# Patient Record
Sex: MALE | Race: ASIAN | NOT HISPANIC OR LATINO | URBAN - METROPOLITAN AREA
[De-identification: names, ages, dates, MRNs, and addresses within clinical notes are randomized per-mention and may not be internally consistent; named-entity substitution may affect disease eponyms.]

---

## 2024-04-27 ENCOUNTER — EMERGENCY (EMERGENCY)
Facility: HOSPITAL | Age: 34
LOS: 1 days | Discharge: HOME | End: 2024-04-27
Attending: EMERGENCY MEDICINE | Admitting: EMERGENCY MEDICINE
Payer: MEDICAID

## 2024-04-27 VITALS
OXYGEN SATURATION: 100 % | DIASTOLIC BLOOD PRESSURE: 65 MMHG | HEART RATE: 77 BPM | SYSTOLIC BLOOD PRESSURE: 104 MMHG | RESPIRATION RATE: 20 BRPM | TEMPERATURE: 98 F

## 2024-04-27 VITALS
TEMPERATURE: 100 F | RESPIRATION RATE: 18 BRPM | DIASTOLIC BLOOD PRESSURE: 71 MMHG | OXYGEN SATURATION: 100 % | SYSTOLIC BLOOD PRESSURE: 111 MMHG | HEART RATE: 110 BPM

## 2024-04-27 LAB
ALBUMIN SERPL ELPH-MCNC: 2.8 G/DL — LOW (ref 3.3–5)
ALP SERPL-CCNC: 135 U/L — HIGH (ref 40–120)
ALT FLD-CCNC: 91 U/L — HIGH (ref 4–41)
ANION GAP SERPL CALC-SCNC: 13 MMOL/L — SIGNIFICANT CHANGE UP (ref 7–14)
ANISOCYTOSIS BLD QL: SLIGHT — SIGNIFICANT CHANGE UP
AST SERPL-CCNC: 61 U/L — HIGH (ref 4–40)
BASE EXCESS BLDV CALC-SCNC: -0.2 MMOL/L — SIGNIFICANT CHANGE UP (ref -2–3)
BASOPHILS # BLD AUTO: 0 K/UL — SIGNIFICANT CHANGE UP (ref 0–0.2)
BASOPHILS NFR BLD AUTO: 0 % — SIGNIFICANT CHANGE UP (ref 0–2)
BILIRUB SERPL-MCNC: 0.5 MG/DL — SIGNIFICANT CHANGE UP (ref 0.2–1.2)
BLOOD GAS VENOUS COMPREHENSIVE RESULT: SIGNIFICANT CHANGE UP
BUN SERPL-MCNC: 5 MG/DL — LOW (ref 7–23)
CALCIUM SERPL-MCNC: 8.5 MG/DL — SIGNIFICANT CHANGE UP (ref 8.4–10.5)
CHLORIDE BLDV-SCNC: 102 MMOL/L — SIGNIFICANT CHANGE UP (ref 96–108)
CHLORIDE SERPL-SCNC: 101 MMOL/L — SIGNIFICANT CHANGE UP (ref 98–107)
CO2 BLDV-SCNC: 23.8 MMOL/L — SIGNIFICANT CHANGE UP (ref 22–26)
CO2 SERPL-SCNC: 19 MMOL/L — LOW (ref 22–31)
CREAT SERPL-MCNC: 0.71 MG/DL — SIGNIFICANT CHANGE UP (ref 0.5–1.3)
CULTURE RESULTS: SIGNIFICANT CHANGE UP
EGFR: 123 ML/MIN/1.73M2 — SIGNIFICANT CHANGE UP
EOSINOPHIL # BLD AUTO: 0 K/UL — SIGNIFICANT CHANGE UP (ref 0–0.5)
EOSINOPHIL NFR BLD AUTO: 0 % — SIGNIFICANT CHANGE UP (ref 0–6)
FLUAV AG NPH QL: SIGNIFICANT CHANGE UP
FLUBV AG NPH QL: SIGNIFICANT CHANGE UP
GAS PNL BLDV: 129 MMOL/L — LOW (ref 136–145)
GAS PNL BLDV: SIGNIFICANT CHANGE UP
GLUCOSE BLDV-MCNC: 116 MG/DL — HIGH (ref 70–99)
GLUCOSE SERPL-MCNC: 99 MG/DL — SIGNIFICANT CHANGE UP (ref 70–99)
HCO3 BLDV-SCNC: 23 MMOL/L — SIGNIFICANT CHANGE UP (ref 22–29)
HCT VFR BLD CALC: 40.2 % — SIGNIFICANT CHANGE UP (ref 39–50)
HCT VFR BLDA CALC: 43 % — SIGNIFICANT CHANGE UP (ref 39–51)
HGB BLD CALC-MCNC: 14.3 G/DL — SIGNIFICANT CHANGE UP (ref 12.6–17.4)
HGB BLD-MCNC: 13.9 G/DL — SIGNIFICANT CHANGE UP (ref 13–17)
HYPOCHROMIA BLD QL: SLIGHT — SIGNIFICANT CHANGE UP
IANC: 20.29 K/UL — HIGH (ref 1.8–7.4)
LACTATE BLDV-MCNC: 1.8 MMOL/L — SIGNIFICANT CHANGE UP (ref 0.5–2)
LYMPHOCYTES # BLD AUTO: 2.48 K/UL — SIGNIFICANT CHANGE UP (ref 1–3.3)
LYMPHOCYTES # BLD AUTO: 9.5 % — LOW (ref 13–44)
MCHC RBC-ENTMCNC: 25.8 PG — LOW (ref 27–34)
MCHC RBC-ENTMCNC: 34.6 GM/DL — SIGNIFICANT CHANGE UP (ref 32–36)
MCV RBC AUTO: 74.6 FL — LOW (ref 80–100)
MICROCYTES BLD QL: SLIGHT — SIGNIFICANT CHANGE UP
MONOCYTES # BLD AUTO: 2.25 K/UL — HIGH (ref 0–0.9)
MONOCYTES NFR BLD AUTO: 8.6 % — SIGNIFICANT CHANGE UP (ref 2–14)
NEUTROPHILS # BLD AUTO: 20.49 K/UL — HIGH (ref 1.8–7.4)
NEUTROPHILS NFR BLD AUTO: 78.4 % — HIGH (ref 43–77)
PCO2 BLDV: 32 MMHG — LOW (ref 42–55)
PH BLDV: 7.46 — HIGH (ref 7.32–7.43)
PLAT MORPH BLD: ABNORMAL
PLATELET # BLD AUTO: 479 K/UL — HIGH (ref 150–400)
PLATELET COUNT - ESTIMATE: NORMAL — SIGNIFICANT CHANGE UP
PO2 BLDV: 38 MMHG — SIGNIFICANT CHANGE UP (ref 25–45)
POLYCHROMASIA BLD QL SMEAR: SLIGHT — SIGNIFICANT CHANGE UP
POTASSIUM BLDV-SCNC: 3.4 MMOL/L — LOW (ref 3.5–5.1)
POTASSIUM SERPL-MCNC: 3.8 MMOL/L — SIGNIFICANT CHANGE UP (ref 3.5–5.3)
POTASSIUM SERPL-SCNC: 3.8 MMOL/L — SIGNIFICANT CHANGE UP (ref 3.5–5.3)
PROT SERPL-MCNC: 7.3 G/DL — SIGNIFICANT CHANGE UP (ref 6–8.3)
RBC # BLD: 5.39 M/UL — SIGNIFICANT CHANGE UP (ref 4.2–5.8)
RBC # FLD: 13.8 % — SIGNIFICANT CHANGE UP (ref 10.3–14.5)
RBC BLD AUTO: ABNORMAL
RSV RNA NPH QL NAA+NON-PROBE: SIGNIFICANT CHANGE UP
SAO2 % BLDV: 58.3 % — LOW (ref 67–88)
SARS-COV-2 RNA SPEC QL NAA+PROBE: SIGNIFICANT CHANGE UP
SODIUM SERPL-SCNC: 133 MMOL/L — LOW (ref 135–145)
SPECIMEN SOURCE: SIGNIFICANT CHANGE UP
VARIANT LYMPHS # BLD: 3.5 % — SIGNIFICANT CHANGE UP (ref 0–6)
WBC # BLD: 26.14 K/UL — HIGH (ref 3.8–10.5)
WBC # FLD AUTO: 26.14 K/UL — HIGH (ref 3.8–10.5)

## 2024-04-27 PROCEDURE — 71046 X-RAY EXAM CHEST 2 VIEWS: CPT | Mod: 26

## 2024-04-27 PROCEDURE — 99285 EMERGENCY DEPT VISIT HI MDM: CPT

## 2024-04-27 PROCEDURE — 93010 ELECTROCARDIOGRAM REPORT: CPT

## 2024-04-27 RX ORDER — SODIUM CHLORIDE 9 MG/ML
1000 INJECTION INTRAMUSCULAR; INTRAVENOUS; SUBCUTANEOUS ONCE
Refills: 0 | Status: COMPLETED | OUTPATIENT
Start: 2024-04-27 | End: 2024-04-27

## 2024-04-27 RX ORDER — CEFTRIAXONE 500 MG/1
1000 INJECTION, POWDER, FOR SOLUTION INTRAMUSCULAR; INTRAVENOUS ONCE
Refills: 0 | Status: COMPLETED | OUTPATIENT
Start: 2024-04-27 | End: 2024-04-27

## 2024-04-27 RX ORDER — AZITHROMYCIN 500 MG/1
1 TABLET, FILM COATED ORAL
Qty: 5 | Refills: 0
Start: 2024-04-27 | End: 2024-05-01

## 2024-04-27 RX ORDER — AZITHROMYCIN 500 MG/1
500 TABLET, FILM COATED ORAL ONCE
Refills: 0 | Status: COMPLETED | OUTPATIENT
Start: 2024-04-27 | End: 2024-04-27

## 2024-04-27 RX ORDER — ACETAMINOPHEN 500 MG
1000 TABLET ORAL ONCE
Refills: 0 | Status: COMPLETED | OUTPATIENT
Start: 2024-04-27 | End: 2024-04-27

## 2024-04-27 RX ADMIN — CEFTRIAXONE 100 MILLIGRAM(S): 500 INJECTION, POWDER, FOR SOLUTION INTRAMUSCULAR; INTRAVENOUS at 07:55

## 2024-04-27 RX ADMIN — SODIUM CHLORIDE 1000 MILLILITER(S): 9 INJECTION INTRAMUSCULAR; INTRAVENOUS; SUBCUTANEOUS at 08:43

## 2024-04-27 RX ADMIN — SODIUM CHLORIDE 1000 MILLILITER(S): 9 INJECTION INTRAMUSCULAR; INTRAVENOUS; SUBCUTANEOUS at 07:35

## 2024-04-27 RX ADMIN — AZITHROMYCIN 255 MILLIGRAM(S): 500 TABLET, FILM COATED ORAL at 08:43

## 2024-04-27 RX ADMIN — Medication 400 MILLIGRAM(S): at 07:34

## 2024-04-27 NOTE — ED ADULT NURSE REASSESSMENT NOTE - NS ED NURSE REASSESS COMMENT FT1
Patient remains at baseline mental status. Appears to be resting comfortably in stretcher, breathing even and unlabored on room air. Vital signs as charted. NAD noted at this time. NSR on monitor. Pt offers no new complaints at this time. Medications administered as ordered as per eMAR. IV site clean dry and intact. Safety maintained, stretcher locked in lowest position with siderails up x2, call bell and personal items within reach.

## 2024-04-27 NOTE — ED PROVIDER NOTE - OBJECTIVE STATEMENT
33-year-old male with no significant past medical history presented to emergency department for flulike symptoms consisting of fever/chills, cough, congestion with some mild shortness of breath since Sunday, 4/21/2024.  Patient recently traveled back from an 18-day trip in [facility].  For started with chills on Sunday.  When he landed on Tuesday 4/23 patient reports his symptoms quickly progressed.  Saw his PCP who started him on amoxicillin but reports concerns for malaria.  Patient denies any malaria prophylaxis prior to his trip.  Denies any known sick contacts.  No abdominal pain, nausea/vomiting/diarrhea.  Patient denies chest pain.  Denies headaches, unilateral weakness or changes in mental status.  Denies rashes.  Denies urinary symptoms.  No cyclical nature fevers, remain persistent. 33-year-old male with no significant past medical history presented to emergency department for flulike symptoms consisting of fever/chills, cough, congestion with some mild shortness of breath since Sunday, 4/21/2024.  Patient recently traveled back from an 18-day trip in [facility].  For started with chills on Sunday. When he landed on Tuesday 4/23 patient reports his symptoms quickly progressed.  Saw his PCP who started him on amoxicillin, of which he only started yesterday, but reported concerns for malaria.  Patient denies any malaria prophylaxis prior to his trip.  Denies any known sick contacts.  No abdominal pain, nausea/vomiting/diarrhea.  Patient denies chest pain.  Denies headaches, unilateral weakness or changes in mental status.  Denies rashes.  Denies urinary symptoms.  No cyclical nature fevers, remain persistent.

## 2024-04-27 NOTE — ED PROVIDER NOTE - NSFOLLOWUPINSTRUCTIONS_ED_ALL_ED_FT
You were seen in the ER for fevers and mild shortness of breath with cough. Your CXR showed signs of multifocal pneumonia. You were treated with a course of IV antibiotics. Your blood work showed signs of an acute infection. Given your travel history we sent off malaria testing; we have low suspicion for malaria or other atypical parasitic or viral diseases, but if the tests are positive you will be notified.    Continue taking the augmentin antibiotic that was given to you by your primary care doctor. Continue taking until finished, even if you feel completely better. Inform your primary doctor if you experience rash, shortness of breath, abdominal pain, or diarrhea.     Another antibiotic called azithromycin was sent to your pharmacy. Please  your antibiotics from the pharmacy and take them as prescribed. Continue taking until finished, even if you feel completely better. Inform your primary doctor if you experience rash, shortness of breath, abdominal pain, or diarrhea.     You may take 500-1000 mg acetaminophen every 6 hours, as needed for pain.  You may take 600 mg ibuprofen every 8 hours, with food, as needed for pain.    Please ensure adequate oral hydration to prevent dehydration.    Follow up with your primary physician in 1-2 days. If needed call [phone] to find a primary care physician or call  [phone] to schedule an appointment with the general medicine.    1. TAKE ALL MEDICATIONS AS DIRECTED.    2. FOR PAIN OR FEVER YOU CAN TAKE IBUPROFEN (MOTRIN, ADVIL) OR ACETAMINOPHEN (TYLENOL) AS NEEDED, AS DIRECTED ON PACKAGING.  3. FOLLOW UP WITH YOUR PRIMARY DOCTOR WITHIN 5 DAYS AS DIRECTED.  4. IF YOU HAD LABS OR IMAGING DONE, YOU WERE GIVEN COPIES OF ALL LABS AND/OR IMAGING RESULTS FROM YOUR ER VISIT--PLEASE TAKE THEM WITH YOU TO YOUR FOLLOW UP APPOINTMENTS.  5. RETURN TO THE ER FOR ANY WORSENING SYMPTOMS OR CONCERNS..  ----------------------------------------------------------------------------------------------------------------   Community-Acquired Pneumonia, Adult  Pneumonia is a lung infection that causes inflammation and the buildup of mucus and fluids in the lungs. This may cause coughing and difficulty breathing. Community-acquired pneumonia is pneumonia that develops in people who are not, and have not recently been, in a hospital or other health care facility.    Usually, pneumonia develops as a result of an illness that is caused by a virus, such as the common cold and the flu (influenza). It can also be caused by bacteria or fungi. While the common cold and influenza can pass from person to person (are contagious), pneumonia itself is not considered contagious.    What are the causes?  The human body, showing how a virus travels from the air to a person's lungs.   This condition may be caused by:  Viruses.  Bacteria.  Fungi.    What increases the risk?  The following factors may make you more likely to develop this condition:  Being over age 65 or having certain medical conditions, such as:  A long-term (chronic) disease, such as: chronic obstructive pulmonary disease (COPD), asthma, heart failure, diabetes, or kidney disease.  A condition that increases the risk of breathing in (aspirating) mucus and other fluids from your mouth and nose.  A weakened body defense system (immune system).  Having had your spleen removed (splenectomy). The spleen is the organ that helps fight germs and infections.  Not cleaning your teeth and gums well (poor dental hygiene).  Using tobacco products.  Traveling to places where germs that cause pneumonia are present or being near certain animals or animal habitats that could have germs that cause pneumonia.    What are the signs or symptoms?  Symptoms of this condition include:  A dry cough or a wet (productive) cough.  A fever, sweating, or chills.  Chest pain, especially when breathing deeply or coughing.  Fast breathing, difficulty breathing, or shortness of breath.  Tiredness (fatigue) and muscle aches.  How is this diagnosed?  An outline of a person's body and an image of an X-ray of the person's chest.   This condition may be diagnosed based on your medical history or a physical exam. You may also have tests, including:  Imaging, such as a chest X-ray or lung ultrasound.  Tests of:  The level of oxygen and other gases in your blood.  Mucus from your lungs (sputum).  Fluid around your lungs (pleural fluid).  Your urine.    How is this treated?  Treatment for this condition depends on many factors, such as the cause of your pneumonia, your medicines, and other medical conditions that you have.    For most adults, pneumonia may be treated at home. In some cases, treatment must happen in a hospital and may include:  Medicines that are given by mouth (orally) or through an IV, including:  Antibiotic medicines, if bacteria caused the pneumonia.  Medicines that kill viruses (antiviral medicines), if a virus caused the pneumonia.  Oxygen therapy.  Severe pneumonia, although rare, may require the following treatments:  Mechanical ventilation. This procedure uses a machine to help you breathe if you cannot breathe well on your own or maintain a safe level of blood oxygen.  Thoracentesis. This procedure removes any buildup of pleural fluid to help with breathing.    Follow these instructions at home:    Medicines    Take over-the-counter and prescription medicines only as told by your health care provider.  Take cough medicine only if you have trouble sleeping. Cough medicine can prevent your body from removing mucus from your lungs.  If you were prescribed antibiotics, take them as told by your health care provider. Do not stop taking the antibiotic even if you start to feel better.      Lifestyle    A sign showing that a person should not drink alcohol.  A sign showing that a person should not smoke.  Do not drink alcohol.  Do not use any products that contain nicotine or tobacco. These products include cigarettes, chewing tobacco, and vaping devices, such as e-cigarettes. If you need help quitting, ask your health care provider.  Eat a healthy diet. This includes plenty of vegetables, fruits, whole grains, low-fat dairy products, and lean protein.    General instructions    Rest a lot and get at least 8 hours of sleep each night.  Sleep in a partly upright position at night. Place a few pillows under your head or sleep in a reclining chair.  Return to your normal activities as told by your health care provider. Ask your health care provider what activities are safe for you.  Drink enough fluid to keep your urine pale yellow. This helps to thin the mucus in your lungs.  If your throat is sore, gargle with a mixture of salt and water 3–4 times a day or as needed. To make salt water, completely dissolve ½–1 tsp (3–6 g) of salt in 1 cup (237 mL) of warm water.  Keep all follow-up visits.    How is this prevented?  You can lower your risk of developing community-acquired pneumonia by:  Getting the pneumonia vaccine. There are different types and schedules of pneumonia vaccines. Ask your health care provider which option is best for you. Consider getting the pneumonia vaccine if:  You are older than 65 years of age.  You are 19–65 years of age and are receiving cancer treatment, have chronic lung disease, or have other medical conditions that affect your immune system. Ask your health care provider if this applies to you.  Getting your influenza vaccine every year. Ask your health care provider which type of vaccine is best for you.  Getting regular dental checkups.  Washing your hands often with soap and water for at least 20 seconds. If soap and water are not available, use hand .    Contact a health care provider if:  You have a fever.  You have trouble sleeping because you cannot control your cough with cough medicine.    Get help right away if:  Your shortness of breath becomes worse.  Your chest pain increases.  Your sickness becomes worse, especially if you are an older adult or have a weak immune system.  You cough up blood.    These symptoms may be an emergency. Get help right away. Call 911.  Do not wait to see if the symptoms will go away.  Do not drive yourself to the hospital.    Summary  Pneumonia is an infection of the lungs.  Community-acquired pneumonia develops in people who have not been in the hospital. It can be caused by bacteria, viruses, or fungi.  This condition may be treated with antibiotics or antiviral medicines.  Severe pneumonia may require a hospital stay and treatment to help with breathing.

## 2024-04-27 NOTE — ED PROVIDER NOTE - CLINICAL SUMMARY MEDICAL DECISION MAKING FREE TEXT BOX
33-year-old male with no significant past medical history presenting to the emergency department for approximately 6 days of flulike symptoms that worsened on Tuesday 4/23.  Patient notes recently traveled back from [facility] after 18-day vacation.  No travel associated, malarial prophylaxis treatments prior to trip.  No known sick contacts.  Primarily with constitutional symptoms and mild shortness of breath with cough.  Patient appears diaphoretic and ill-appearing mild distress but otherwise hemodynamically stable.  Febrile with evidence of systemic infectious disease.  Differential diagnosis includes but is not limited to viral syndrome versus PNA versus less likely, associated diseases such as malaria, dengue.  Exam also notable for bibasilar coarse rales most prominent focally in the right lower quadrant.  Mildly tachypneic but otherwise not in acute respiratory distress or impending failure.  Will get screening labs, chest x-ray, travel associated blood parasite smear.  IVF and over meds for symptomatic relief.  Dispo pending labs, imaging and reassessment. 33-year-old male with no significant past medical history presenting to the emergency department for approximately 6 days of flulike symptoms that worsened on Tuesday 4/23.  Patient notes recently traveled back from [facility] after 18-day vacation.  Saw PCP who started patient on amoxicillin only yesterday, also endorsed some concern for travel associated infectious diseases such as malaria. No travel associated, malarial prophylaxis treatments prior to trip.  No known sick contacts.  Primarily with constitutional symptoms and mild shortness of breath with cough.  Patient appears diaphoretic and ill-appearing mild distress but otherwise hemodynamically stable.  Febrile with evidence of systemic infectious disease.  Differential diagnosis includes but is not limited to viral syndrome versus PNA versus less likely, associated diseases such as malaria, dengue.  Exam also notable for bibasilar coarse rales most prominent focally in the right lower quadrant.  Mildly tachypneic but otherwise not in acute respiratory distress or impending failure.  Will get screening labs, chest x-ray, travel associated blood parasite smear.  IVF and over meds for symptomatic relief.  Dispo pending labs, imaging and reassessment.

## 2024-04-27 NOTE — ED ADULT NURSE REASSESSMENT NOTE - NS ED NURSE REASSESS COMMENT FT1
Patient received from night RN Lamar at 0820. Pt remains at baseline mental status AOx4, awake and appears to be resting comfortably in stretcher. No acute distress noted. Respirations even and unlabored on room air. VS as charted. NSR on Monitor. Medications administered as ordered as per eMAR. Safety maintained, stretcher locked in lowest position with siderails up x2, call bell and personal items within reach.

## 2024-04-27 NOTE — ED PROVIDER NOTE - ATTENDING CONTRIBUTION TO CARE
Otherwise healthy male p/w fevers, dry cough, fatigue x5 days. Pt recently returned from [facility], denies hemoptysis, joint paints, myalgias. Pt noted to be febrile and tachy here, good BP, good 02 sat. PCP had started pt on augmentin, unclear for what, has only taken one dose. CXR here showing multifocal pna, will treat for CAP. Pending labs with blood cultures.

## 2024-04-27 NOTE — ED PROVIDER NOTE - PROGRESS NOTE DETAILS
Lebron Mehta MD PGY1: Patient reassessed, stable. CXR showing multifocal PNA pattern. Will send off remainder of sepsis panel; withholding urinalysis / urine culture giving presumed pulmonary source and pt without urinary symptoms. CTX and azithro. Dispo pending. Lebron Mehta MD, PGY1: In the interim patient endorsed to Dr. Rios that he has been following with [facility] () w/ Dr. 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 Review of outpt records provided by patient demonstrates he was initially diagnosed in 2022 and has undergone numerous investigations for acute blood borne neoplasms as recently as August 2023 that showed no acute evidence of neoplasm. [facility] on call physician notified and awaiting their call back for further discussion into patients history. Lebron Mehta MD PGY1: Patient reassessed, stable. Patient with clinical improvement after fluids and ofirmev. Spoke with on-call ID fellow for curbside consult; given lack of constitutional symptoms and CXR without overt TB pathologic appearance, very low clinical suspicion for TB. Cannot r/o latent but per ID patient can get quantiferon and CT outpatient if symptoms persist. Labs with leukocytosis ~20 and mild transaminitis likely c/w w/ viral etiology to multifocal PNA. Will get ambulatory O2 sat and anticipate discharge. Will c/w augmentin tx and send rx for azithromycin. Lebron Mehta MD PGY1: Patient reassessed, stable. Ambulatory saturation 99%. Patient to be discharged. Lab and imaging results were discussed with the patient. Shared decision making was held between provider and patient with regards to disposition decision. All questions and concerns were addressed. Patient is agreeable to disposition plan. Sarah HERNANDEZ: Patient signed out by Dr. Rios pending reassessment and likely DC home.  Patient was reassessed.  He states he is overall feeling much better.  He is saturating 100% on room air.  I discussed results with him in detail.  He understands that he has pneumonia and should continue antibiotics.  Patient states that he has a primary care physician that he can follow-up with within 1 week.  Return precautions also discussed in detail.  This includes worsening symptoms such as worsening shortness of breath, persistent fevers, new symptoms of concern.  Patient  to have an ambulatory sat to ensure that he is safe for discharge.  Patient states that he does feel comfortable going home. Sarah HERNANDEZ:   Ambulatory sat 99%.  Plan for DC with strict return precautions.

## 2024-04-27 NOTE — ED PROVIDER NOTE - PATIENT PORTAL LINK FT
You can access the FollowMyHealth Patient Portal offered by [facility] by registering at the following website: [email or web address]. By joining [employer]’s FollowMyHealth portal, you will also be able to view your health information using other applications (apps) compatible with our system.

## 2024-04-27 NOTE — ED PROVIDER NOTE - PHYSICAL EXAMINATION
GEN: Patient awake and alert. Mild distress, ill appearing, diaphoretic.   Head: Normocephalic, atraumatic.  Neck: Nontender, full ROM.   Eyes: PERRLA b/l. EOMI, no scleral icterus, no conjunctival injection. Moist mucous membranes.  CARDIAC: Regular tachycardia. Normal S1, S2. No murmur, rubs, or gallops. No peripheral edema noted.  PULM: Speaking in full sentences. Lungs with b/l bibasilar coarse rales most prominent focally in RLQ. Mildly tachypneic, no accessory muscle usage or nasal flaring.  ABD: Soft, nontender, nondistended. No rebound, no involuntary guarding.  MSK: Moving all extremities spontaneously. Full ROM in extremities. No obvious deformity.  NEURO: A&Ox3, no focal neurological deficits  SKIN: Warm, dry, no rash, no lesions, no open wounds. No urticaria. No jaundice.

## 2024-04-27 NOTE — ED ADULT NURSE NOTE - OBJECTIVE STATEMENT
Patient received in room 8, A&Ox3 ambulatory at baseline pmh Patient received in room 8, A&Ox3 ambulatory at baseline presenting to ED c/o fever chills, cough, congestion and some mild SOB since Sunday. Pt recently just traveled to [place] Patient received in room 8, A&Ox3 ambulatory at baseline presenting to ED c/o fever chills, cough, generalized weakness and some mild SOB since Sunday. Pt just recently traveled to [facility]. Pt saw his PCP and was started on Amoxicillin which he only started yesterday and had concerns for malaria. Denies known sick contacts, CP, n/v/d, abdominal pain, visual changes, urinary symptoms, rashes, numbness/ tingling. Right 20g placed, labs drawn and sent. NSR on cardiac monitor. Sating 100% on room air.

## 2024-04-27 NOTE — ED ADULT TRIAGE NOTE - CHIEF COMPLAINT QUOTE
pt c/o fever, chills, cough, SOB, dizziness x 4 days. Per family symptoms got worse when he returned. Pt fever at home was 101.7. last took Tylenol at 9pm. Pt was seen by PMD and started on amoxicillin. No complaints of chest pain, headache, nausea,  vomiting    verbalized.. pt c/o fever, chills, cough, SOB, dizziness x 4 days. Per family symptoms got worse when he returned from [facility] . Pt fever at home was 101.7. last took Tylenol at 9pm. Pt was seen by PMD and started on amoxicillin. No complaints of chest pain, headache, nausea,  vomiting    verbalized. pt c/o generalized weakness, fever, chills, cough, SOB, dizziness x 4 days. Per family symptoms got worse when he returned from [facility] . Pt fever at home was 101.7. last took Tylenol at 9pm. Pt was seen by PMD and started on amoxicillin. No complaints of chest pain, headache, nausea,  vomiting    verbalized.

## 2024-04-27 NOTE — ED ADULT NURSE NOTE - CHIEF COMPLAINT QUOTE
pt c/o generalized weakness, fever, chills, cough, SOB, dizziness x 4 days. Per family symptoms got worse when he returned from [facility] . Pt fever at home was 101.7. last took Tylenol at 9pm. Pt was seen by PMD and started on amoxicillin. No complaints of chest pain, headache, nausea,  vomiting    verbalized.

## 2024-05-02 LAB
CULTURE RESULTS: SIGNIFICANT CHANGE UP
CULTURE RESULTS: SIGNIFICANT CHANGE UP
SPECIMEN SOURCE: SIGNIFICANT CHANGE UP
SPECIMEN SOURCE: SIGNIFICANT CHANGE UP